# Patient Record
Sex: FEMALE | Race: WHITE
[De-identification: names, ages, dates, MRNs, and addresses within clinical notes are randomized per-mention and may not be internally consistent; named-entity substitution may affect disease eponyms.]

---

## 2019-11-20 ENCOUNTER — HOSPITAL ENCOUNTER (OUTPATIENT)
Dept: HOSPITAL 76 - DI | Age: 34
Discharge: HOME | End: 2019-11-20
Attending: OBSTETRICS & GYNECOLOGY
Payer: COMMERCIAL

## 2019-11-20 DIAGNOSIS — Z32.01: Primary | ICD-10-CM

## 2019-11-20 PROCEDURE — 76801 OB US < 14 WKS SINGLE FETUS: CPT

## 2019-11-20 NOTE — ULTRASOUND REPORT
Reason:  PREGNANCY TEST POSITIVE

Procedure Date:  11/20/2019   

Accession Number:  [id number] / [id number]                    

Procedure:  US  - OB First Trimester CPT Code:  

 

***Final Report***

 

 

FULL RESULT:

 

 

EXAM:

FIRST TRIMESTER OBSTETRIC ULTRASOUND

(Less than 11 weeks)

 

EXAM DATE: 11/20/2019 10:46 AM.

 

CLINICAL HISTORY: PREGNANCY TEST POSITIVE.

LMP: 09/23/2019.

 

COMPARISONS: None.

 

TECHNIQUE: Transabdominal and transvaginal ultrasound examination with 

static image documentation.

 

CLINICAL DATES:

EGA 8 weeks 2 days with MEIR 06/29/2020 based on LMP.

 

ASSESSMENT:

Gestational Sac: Single intrauterine.  Mean gestational sac diameter: 

14.9 mm = 6 weeks 2 days.

Embryo: CRL (crown-rump length) 2.5 mm = 5 weeks 6 days.

Cardiac activity:  Not visible at this time.

Yolk sac:  Not visible.

Amniotic fluid: Not accurately assessed at this gestational age.

Early placenta: Not visible at this gestational age.

Other: No perigestational fluid collection demonstrated.

 

MATERNAL STRUCTURES:

Uterus: . Unremarkable.

Cervix: Closed.

Right Ovary/Adnexa: The ovary measures 3.6 x 3.4 x 3.2 cm, volume 20.4 

cc. There is a dominant cyst measuring 2.7 x 2.5 x 2.5.

Left Ovary/Adnexa: The ovary measures 2.5 x 1.6 x 2.9 cm, volume 6.1 cc.  

Unremarkable.

Free Fluid: None.

 

Other: None.

IMPRESSION:

1. Intrauterine gestational sac with fetal pole. No cardiac activity 

identified at this time, however the crown-rump length measures 2.5 mm 

and cardiac activity is not always visible by ultrasound at this age. 

There is a discordance between the ultrasound age of 5 weeks 6 days by 

crown-rump length and the clinical age of 8 weeks 2 days by LMP. This 

exam does not differentiate normal viable intrauterine pregnancy from 

blighted ovum given early age.

 

[facility]

## 2019-11-25 ENCOUNTER — HOSPITAL ENCOUNTER (OUTPATIENT)
Dept: HOSPITAL 76 - LAB | Age: 34
Discharge: HOME | End: 2019-11-25
Attending: OBSTETRICS & GYNECOLOGY
Payer: COMMERCIAL

## 2019-11-25 DIAGNOSIS — O20.0: Primary | ICD-10-CM

## 2019-11-25 DIAGNOSIS — Z3A.00: ICD-10-CM

## 2019-11-25 LAB
EST. AVERAGE GLUCOSE BLD GHB EST-MCNC: 114 MG/DL (ref 70–100)
HB2 TOTAL: 13.7 G/DL
HBA1C BLD-MCNC: 0.52 G/DL
HEMOGLOBIN A1C %: 5.6 % (ref 4.6–6.2)

## 2019-11-25 PROCEDURE — 86900 BLOOD TYPING SEROLOGIC ABO: CPT

## 2019-11-25 PROCEDURE — 84702 CHORIONIC GONADOTROPIN TEST: CPT

## 2019-11-25 PROCEDURE — 36415 COLL VENOUS BLD VENIPUNCTURE: CPT

## 2019-11-25 PROCEDURE — 86901 BLOOD TYPING SEROLOGIC RH(D): CPT

## 2019-11-25 PROCEDURE — 83036 HEMOGLOBIN GLYCOSYLATED A1C: CPT

## 2019-11-27 ENCOUNTER — HOSPITAL ENCOUNTER (OUTPATIENT)
Dept: HOSPITAL 76 - LAB | Age: 34
Discharge: HOME | End: 2019-11-27
Attending: OBSTETRICS & GYNECOLOGY
Payer: COMMERCIAL

## 2019-11-27 DIAGNOSIS — O20.0: Primary | ICD-10-CM

## 2019-11-27 DIAGNOSIS — Z3A.00: ICD-10-CM

## 2019-11-27 PROCEDURE — 36415 COLL VENOUS BLD VENIPUNCTURE: CPT

## 2019-11-27 PROCEDURE — 84702 CHORIONIC GONADOTROPIN TEST: CPT
